# Patient Record
(demographics unavailable — no encounter records)

---

## 2024-11-20 NOTE — HISTORY OF PRESENT ILLNESS
[de-identified] : Patient is a 67 year old female who presents with low back pain.  10.28.24 Patient is a 67 year old female who presents with low back pain. This has been occurring for a while. She endorses the pain radiates down the LT LE past the knee. She reports she frequently walks and does Pilates, however pain is exacerbated with side sleeping and crossing of her legs. Patient applies OTC Voltaren gel for this issue. Denies saddle anesthesia.

## 2024-11-20 NOTE — PHYSICAL EXAM
[de-identified] : Lumbar Physical Exam Gait - Normal Station - Normal Sagittal Balance - Normal Compensatory Mechanism? - None  Heel walk - Normal Toe walk - Normal  Reflexes  Patellar - Normal Gastroc - Normal Clonus - No  Hip Exam - Normal Straight leg raise - None  Pulses - 2+ DP/PT Range of motion - Normal   Sensation Sensation intact to light touch in L1, L2, L3, L4, L5, and S1 dermatomes bilaterally Motor IP Quad HS TA Gastroc EHL Right 3+/5 5/5 5/5 5/5 5/5 5/5 5/5 Left 3+/5 5/5 5/5 5/5 5/5 5/5 5/5 [de-identified] : Lumbar radiographs: facet arthropathy  L4-L5 slight spondylolisthesis with slight motion

## 2024-12-15 NOTE — HISTORY OF PRESENT ILLNESS
[de-identified] :  10.28.24 Patient is a 67 year old female who presents with low back pain. This has been occurring for a while. She endorses the pain radiates down the LT LE past the knee. She reports she frequently walks and does Pilates, however pain is exacerbated with side sleeping and crossing of her legs. Patient applies OTC Voltaren gel for this issue. Denies saddle anesthesia.

## 2024-12-15 NOTE — PHYSICAL EXAM
[de-identified] : Lumbar Physical Exam Gait - Normal Station - Normal Sagittal Balance - Normal Compensatory Mechanism? - None  Heel walk - Normal Toe walk - Normal  Reflexes  Patellar - Normal Gastroc - Normal Clonus - No  Hip Exam - Normal Straight leg raise - None  Pulses - 2+ DP/PT Range of motion - Normal   Sensation Sensation intact to light touch in L1, L2, L3, L4, L5, and S1 dermatomes bilaterally Motor IP Quad HS TA Gastroc EHL Right 3+/5 5/5 5/5 5/5 5/5 5/5 5/5 Left 3+/5 5/5 5/5 5/5 5/5 5/5 5/5 [de-identified] : Lumbar radiographs: facet arthropathy  L4-L5 slight spondylolisthesis with slight motion